# Patient Record
Sex: FEMALE | Race: ASIAN | NOT HISPANIC OR LATINO | URBAN - METROPOLITAN AREA
[De-identification: names, ages, dates, MRNs, and addresses within clinical notes are randomized per-mention and may not be internally consistent; named-entity substitution may affect disease eponyms.]

---

## 2023-09-20 ENCOUNTER — EMERGENCY (EMERGENCY)
Facility: HOSPITAL | Age: 23
LOS: 1 days | Discharge: STILL ADMITTED OR ADMINISTRATIVE | End: 2023-09-20
Attending: EMERGENCY MEDICINE | Admitting: EMERGENCY MEDICINE
Payer: MEDICAID

## 2023-09-20 ENCOUNTER — OUTPATIENT (OUTPATIENT)
Dept: INPATIENT UNIT | Facility: HOSPITAL | Age: 23
LOS: 1 days | Discharge: HOME | End: 2023-09-20
Payer: MEDICAID

## 2023-09-20 VITALS
DIASTOLIC BLOOD PRESSURE: 69 MMHG | SYSTOLIC BLOOD PRESSURE: 134 MMHG | TEMPERATURE: 101 F | HEART RATE: 110 BPM | RESPIRATION RATE: 16 BRPM

## 2023-09-20 VITALS
RESPIRATION RATE: 20 BRPM | HEART RATE: 121 BPM | OXYGEN SATURATION: 97 % | DIASTOLIC BLOOD PRESSURE: 85 MMHG | TEMPERATURE: 99 F | SYSTOLIC BLOOD PRESSURE: 148 MMHG

## 2023-09-20 DIAGNOSIS — O26.899 OTHER SPECIFIED PREGNANCY RELATED CONDITIONS, UNSPECIFIED TRIMESTER: ICD-10-CM

## 2023-09-20 LAB
APPEARANCE UR: ABNORMAL
B PERT DNA SPEC QL NAA+PROBE: SIGNIFICANT CHANGE UP
B PERT+PARAPERT DNA PNL SPEC NAA+PROBE: SIGNIFICANT CHANGE UP
BACTERIA # UR AUTO: ABNORMAL /HPF
BILIRUB UR-MCNC: NEGATIVE — SIGNIFICANT CHANGE UP
BORDETELLA PARAPERTUSSIS (RAPRVP): SIGNIFICANT CHANGE UP
C PNEUM DNA SPEC QL NAA+PROBE: SIGNIFICANT CHANGE UP
CAST: 0 /LPF — SIGNIFICANT CHANGE UP (ref 0–4)
COLOR SPEC: YELLOW — SIGNIFICANT CHANGE UP
DIFF PNL FLD: NEGATIVE — SIGNIFICANT CHANGE UP
FLUAV SUBTYP SPEC NAA+PROBE: SIGNIFICANT CHANGE UP
FLUBV RNA SPEC QL NAA+PROBE: SIGNIFICANT CHANGE UP
GLUCOSE UR QL: NEGATIVE MG/DL — SIGNIFICANT CHANGE UP
HADV DNA SPEC QL NAA+PROBE: SIGNIFICANT CHANGE UP
HCOV 229E RNA SPEC QL NAA+PROBE: SIGNIFICANT CHANGE UP
HCOV HKU1 RNA SPEC QL NAA+PROBE: SIGNIFICANT CHANGE UP
HCOV NL63 RNA SPEC QL NAA+PROBE: SIGNIFICANT CHANGE UP
HCOV OC43 RNA SPEC QL NAA+PROBE: SIGNIFICANT CHANGE UP
HCT VFR BLD CALC: 40.1 % — SIGNIFICANT CHANGE UP (ref 34.5–45)
HGB BLD-MCNC: 13.2 G/DL — SIGNIFICANT CHANGE UP (ref 11.5–15.5)
HMPV RNA SPEC QL NAA+PROBE: SIGNIFICANT CHANGE UP
HPIV1 RNA SPEC QL NAA+PROBE: SIGNIFICANT CHANGE UP
HPIV2 RNA SPEC QL NAA+PROBE: SIGNIFICANT CHANGE UP
HPIV3 RNA SPEC QL NAA+PROBE: SIGNIFICANT CHANGE UP
HPIV4 RNA SPEC QL NAA+PROBE: SIGNIFICANT CHANGE UP
KETONES UR-MCNC: ABNORMAL MG/DL
LEUKOCYTE ESTERASE UR-ACNC: ABNORMAL
M PNEUMO DNA SPEC QL NAA+PROBE: SIGNIFICANT CHANGE UP
MCHC RBC-ENTMCNC: 27 PG — SIGNIFICANT CHANGE UP (ref 27–34)
MCHC RBC-ENTMCNC: 32.9 GM/DL — SIGNIFICANT CHANGE UP (ref 32–36)
MCV RBC AUTO: 82 FL — SIGNIFICANT CHANGE UP (ref 80–100)
NITRITE UR-MCNC: NEGATIVE — SIGNIFICANT CHANGE UP
NRBC # BLD: 0 /100 WBCS — SIGNIFICANT CHANGE UP (ref 0–0)
NRBC # FLD: 0 K/UL — SIGNIFICANT CHANGE UP (ref 0–0)
PH UR: 7.5 — SIGNIFICANT CHANGE UP (ref 5–8)
PLATELET # BLD AUTO: 158 K/UL — SIGNIFICANT CHANGE UP (ref 150–400)
PROT UR-MCNC: NEGATIVE MG/DL — SIGNIFICANT CHANGE UP
RAPID RVP RESULT: SIGNIFICANT CHANGE UP
RBC # BLD: 4.89 M/UL — SIGNIFICANT CHANGE UP (ref 3.8–5.2)
RBC # FLD: 14.7 % — HIGH (ref 10.3–14.5)
RBC CASTS # UR COMP ASSIST: 1 /HPF — SIGNIFICANT CHANGE UP (ref 0–4)
REVIEW: SIGNIFICANT CHANGE UP
RSV RNA SPEC QL NAA+PROBE: SIGNIFICANT CHANGE UP
RV+EV RNA SPEC QL NAA+PROBE: SIGNIFICANT CHANGE UP
SARS-COV-2 RNA SPEC QL NAA+PROBE: SIGNIFICANT CHANGE UP
SP GR SPEC: 1.01 — SIGNIFICANT CHANGE UP (ref 1–1.03)
SQUAMOUS # UR AUTO: 3 /HPF — SIGNIFICANT CHANGE UP (ref 0–5)
UROBILINOGEN FLD QL: 1 MG/DL — SIGNIFICANT CHANGE UP (ref 0.2–1)
WBC # BLD: 6.53 K/UL — SIGNIFICANT CHANGE UP (ref 3.8–10.5)
WBC # FLD AUTO: 6.53 K/UL — SIGNIFICANT CHANGE UP (ref 3.8–10.5)
WBC UR QL: 3 /HPF — SIGNIFICANT CHANGE UP (ref 0–5)

## 2023-09-20 PROCEDURE — 93010 ELECTROCARDIOGRAM REPORT: CPT

## 2023-09-20 PROCEDURE — 59025 FETAL NON-STRESS TEST: CPT | Mod: 26

## 2023-09-20 PROCEDURE — 99221 1ST HOSP IP/OBS SF/LOW 40: CPT | Mod: 25

## 2023-09-20 PROCEDURE — L9996: CPT

## 2023-09-20 RX ORDER — SODIUM CHLORIDE 9 MG/ML
1000 INJECTION, SOLUTION INTRAVENOUS ONCE
Refills: 0 | Status: COMPLETED | OUTPATIENT
Start: 2023-09-20 | End: 2023-09-20

## 2023-09-20 RX ORDER — ACETAMINOPHEN 500 MG
1000 TABLET ORAL ONCE
Refills: 0 | Status: COMPLETED | OUTPATIENT
Start: 2023-09-20 | End: 2023-09-20

## 2023-09-20 RX ORDER — FERROUS SULFATE 325(65) MG
0 TABLET ORAL
Refills: 0 | DISCHARGE

## 2023-09-20 RX ORDER — CALCIUM CARBONATE 500(1250)
0 TABLET ORAL
Refills: 0 | DISCHARGE

## 2023-09-20 RX ADMIN — Medication 1000 MILLIGRAM(S): at 22:48

## 2023-09-20 RX ADMIN — SODIUM CHLORIDE 1000 MILLILITER(S): 9 INJECTION, SOLUTION INTRAVENOUS at 22:41

## 2023-09-20 RX ADMIN — SODIUM CHLORIDE 1000 MILLILITER(S): 9 INJECTION, SOLUTION INTRAVENOUS at 22:42

## 2023-09-20 NOTE — OB PROVIDER TRIAGE NOTE - NSHPLABSRESULTS_GEN_ALL_CORE
13.2   6.53  )-----------( 158      ( 20 Sep 2023 21:20 )             40.1     Urinalysis Basic - ( 20 Sep 2023 21:20 )    Color: Yellow / Appearance: Cloudy / S.013 / pH: x  Gluc: x / Ketone: Trace mg/dL  / Bili: Negative / Urobili: 1.0 mg/dL   Blood: x / Protein: Negative mg/dL / Nitrite: Negative   Leuk Esterase: Trace / RBC: 1 /HPF / WBC 3 /HPF   Sq Epi: x / Non Sq Epi: 3 /HPF / Bacteria: Occasional /HPF

## 2023-09-20 NOTE — OB PROVIDER TRIAGE NOTE - NSOBPROVIDERNOTE_OBGYN_ALL_OB_FT
24 y/o , EDC , [place] 33 weeks,   -CBC, urinalysis wnl  -1L LR bolus given   -RVP sent  -1000 mg tylenol given for fever relief  22:45- continuous regular contractions on toco, pt denies contractions, additional 1L bolus given     -d/w Dr. Gonzalo Salas, PAC 24 y/o , [facility] , [place] 33 weeks,   -CBC, urinalysis wnl  -1L LR bolus given   -RVP negative  -1000 mg tylenol given for fever relief  22:45- continuous regular contractions on toco, pt denies contractions, additional 1L bolus given   - temp taken again, now nonfebrile  - Patient to be discharged home with follow up and return precautions  - Please schedule an appointment with our clinic to establish prenatal care -[phone]  - Please return for decreased / no fetal movement, vaginal bleeding similar to that of a period, leaking / gush of fluid, regular contractions occurring 4-5 minutes  for one hour or requiring pain medication   - Patient educated of plan and demonstrates understanding. All questions answered. Discharge instructions provided and signed.     Plan d/w  Dr. Gonzalo Salas, PAC    Discharge Time:  00:15

## 2023-09-20 NOTE — OB PERINATAL HUDDLE NOTE - NS_HUDDLECOMMENTS_OBGYN_ALL_OB_FT
Went to  huddle with PGY-3.  At time of initial evaluation pt reports this is her first pregnancy and she is 32 weeks pregnant.  Pt presented to ED c/o subjective fever.  Pt denied VB/LOF/abd pain, pt reported +FM.  Per ED pt afebrile on presentation, maternal 's.  BP elevated 148/85 decision made to bring pt straight up to L+D.  Pt to be evaluated and monitored right away in L+D triage, charge RN and triage staff made aware.     Leona Yates MD

## 2023-09-20 NOTE — OB PROVIDER TRIAGE NOTE - NSHPPHYSICALEXAM_GEN_ALL_CORE
Vital Signs Last 24 Hrs  T(C): 38.1 (20 Sep 2023 20:45), Max: 38.1 (20 Sep 2023 20:45)  T(F): 100.6 (20 Sep 2023 20:45), Max: 100.6 (20 Sep 2023 20:45)  HR: 107 (20 Sep 2023 20:52) (107 - 121)  BP: 124/80 (20 Sep 2023 20:52) (124/80 - 148/85)  BP(mean): --  RR: 16 (20 Sep 2023 20:45) (16 - 20)  SpO2: 97% (20 Sep 2023 18:49) (97% - 97%)    Gen: A/O x3  Abd: Gravid uterus, toco in place   TAS: vertex, posterior placenta, DARIAN 15.99,  bpm, BPP 8/8 images saved in ASOB  FHR: 140 baseline, moderate variability, with accelerations, no decelerations, reactive  CTX: regular, q1-4 min   SVE: Vital Signs Last 24 Hrs  T(C): 38.1 (20 Sep 2023 20:45), Max: 38.1 (20 Sep 2023 20:45)  T(F): 100.6 (20 Sep 2023 20:45), Max: 100.6 (20 Sep 2023 20:45)  HR: 107 (20 Sep 2023 20:52) (107 - 121)  BP: 124/80 (20 Sep 2023 20:52) (124/80 - 148/85)  BP(mean): --  RR: 16 (20 Sep 2023 20:45) (16 - 20)  SpO2: 97% (20 Sep 2023 18:49) (97% - 97%)    Gen: A/O x3  Abd: Gravid uterus, toco in place   TAS: vertex, posterior placenta, DARIAN 15.99,  bpm, BPP 8/8 images saved in ASOB  FHR: 140 baseline, moderate variability, with accelerations, no decelerations, reactive  CTX: regular, q1-4 min   SVE: 0/0/-3

## 2023-09-20 NOTE — OB RN TRIAGE NOTE - FALL HARM RISK - UNIVERSAL INTERVENTIONS
Bed in lowest position, wheels locked, appropriate side rails in place/Call bell, personal items and telephone in reach/Instruct patient to call for assistance before getting out of bed or chair/Non-slip footwear when patient is out of bed/[place] to call system/Physically safe environment - no spills, clutter or unnecessary equipment/Purposeful Proactive Rounding/Room/bathroom lighting operational, light cord in reach

## 2023-09-20 NOTE — OB PERINATAL HUDDLE NOTE - NS_HUDDLEASSDIAG_OBGYN_ALL_OB_FT
@31+4 (YANCY ) coming in with subjective fever for 1 day. Patient recently immigrated from [place] yesterday. Denies other symptoms including SOB, CP, trouble breathing, N/V, headache, diarrhea, or sick contacts. Patient denies complications with this pregnancy. Denies medical/surgical history. Reports fetal movement and denies LOF, VB, CTX.    T(C): 37.1 (23 @ 18:49), Max: 37.1 (23 @ 18:49)  HR: 121 (23 @ 18:49) (121 - 121)  BP: 148/85 (-23 @ 18:49) (148/85 - 148/85)  RR: 20 (23 @ 18:49) (20 - 20)  SpO2: 97% (23 @ 18:49) (97% - 97%)

## 2023-09-20 NOTE — CHART NOTE - NSCHARTNOTEFT_GEN_A_CORE
huddle called. Pt afebrile. BP mildly elevated and tachycardic.  Per OB/GYN attending pt to go straight to L&D.     Attending Fady

## 2023-09-20 NOTE — ED ADULT TRIAGE NOTE - CHIEF COMPLAINT QUOTE
Pt 32 weeks pregnant. States that she had a fever this AM. Pt denies any abd cramping or vaginal bleeding. Positive fetal movement.

## 2023-09-20 NOTE — OB PROVIDER TRIAGE NOTE - HISTORY OF PRESENT ILLNESS
24 y/o , [facility] , [place] 33 weeks, presents for subjective fever and chills since this morning. Reports she took 2 panadol at home which helped, denies current chills. Denies cough, congestion, nausea, vomiting, sick contacts, dysuria, hematuria. Denies VB/ LOF/ contractions. Reports good FM.    Pt moved from [facility] yesterday. Pt received PNC in [place], would like to establish PNC here with clinic.    AP Course: Uncomplicated per pt

## 2023-09-20 NOTE — OB PROVIDER TRIAGE NOTE - PLAN OF CARE
24 y/o , ED[facility] , [place] 33 weeks,   -CBC, urinalysis wnl  -1L LR bolus given   -RVP negative  -1000 mg tylenol given for fever relief  22:45- continuous regular contractions on toco, pt denies contractions, additional 1L bolus given   - temp taken again, now nonfebrile  - Patient to be discharged home with follow up and return precautions  - Please schedule an appointment with our clinic to establish prenatal care -[phone]  - Please return for decreased / no fetal movement, vaginal bleeding similar to that of a period, leaking / gush of fluid, regular contractions occurring 4-5 minutes  for one hour or requiring pain medication   - Patient educated of plan and demonstrates understanding. All questions answered. Discharge instructions provided and signed.     Plan d/w  Dr. Sánchez

## 2023-09-20 NOTE — OB PROVIDER TRIAGE NOTE - ADDITIONAL INSTRUCTIONS
22 y/o , EDC , [place] 33 weeks,   -CBC, urinalysis wnl  -1L LR bolus given   -RVP negative  -1000 mg tylenol given for fever relief  22:45- continuous regular contractions on toco, pt denies contractions, additional 1L bolus given   - temp taken again, now nonfebrile  - Patient to be discharged home with follow up and return precautions  - Please schedule an appointment with our clinic to establish prenatal care -[phone]  - Please return for decreased / no fetal movement, vaginal bleeding similar to that of a period, leaking / gush of fluid, regular contractions occurring 4-5 minutes  for one hour or requiring pain medication   - Patient educated of plan and demonstrates understanding. All questions answered. Discharge instructions provided and signed.     Plan d/w  Dr. Sánchez

## 2023-09-21 VITALS — SYSTOLIC BLOOD PRESSURE: 121 MMHG | HEART RATE: 100 BPM | DIASTOLIC BLOOD PRESSURE: 56 MMHG

## 2023-09-21 DIAGNOSIS — R50.9 FEVER, UNSPECIFIED: ICD-10-CM

## 2023-09-21 DIAGNOSIS — Z3A.33 33 WEEKS GESTATION OF PREGNANCY: ICD-10-CM

## 2023-09-21 DIAGNOSIS — O26.893 OTHER SPECIFIED PREGNANCY RELATED CONDITIONS, THIRD TRIMESTER: ICD-10-CM

## 2023-09-21 DIAGNOSIS — O09.293 SUPERVISION OF PREGNANCY WITH OTHER POOR REPRODUCTIVE OR OBSTETRIC HISTORY, THIRD TRIMESTER: ICD-10-CM

## 2023-09-27 ENCOUNTER — APPOINTMENT (OUTPATIENT)
Dept: OBGYN | Facility: CLINIC | Age: 23
End: 2023-09-27